# Patient Record
Sex: MALE | Race: WHITE | NOT HISPANIC OR LATINO
[De-identification: names, ages, dates, MRNs, and addresses within clinical notes are randomized per-mention and may not be internally consistent; named-entity substitution may affect disease eponyms.]

---

## 2018-02-05 ENCOUNTER — TRANSCRIPTION ENCOUNTER (OUTPATIENT)
Age: 33
End: 2018-02-05

## 2018-09-08 ENCOUNTER — TRANSCRIPTION ENCOUNTER (OUTPATIENT)
Age: 33
End: 2018-09-08

## 2022-10-31 ENCOUNTER — NON-APPOINTMENT (OUTPATIENT)
Age: 37
End: 2022-10-31

## 2022-11-07 ENCOUNTER — NON-APPOINTMENT (OUTPATIENT)
Age: 37
End: 2022-11-07

## 2022-12-11 ENCOUNTER — NON-APPOINTMENT (OUTPATIENT)
Age: 37
End: 2022-12-11

## 2023-03-22 ENCOUNTER — APPOINTMENT (OUTPATIENT)
Dept: GASTROENTEROLOGY | Facility: CLINIC | Age: 38
End: 2023-03-22
Payer: COMMERCIAL

## 2023-03-22 DIAGNOSIS — K21.9 GASTRO-ESOPHAGEAL REFLUX DISEASE W/OUT ESOPHAGITIS: ICD-10-CM

## 2023-03-22 DIAGNOSIS — R10.13 EPIGASTRIC PAIN: ICD-10-CM

## 2023-03-22 DIAGNOSIS — Z80.3 FAMILY HISTORY OF MALIGNANT NEOPLASM OF BREAST: ICD-10-CM

## 2023-03-22 DIAGNOSIS — Z78.9 OTHER SPECIFIED HEALTH STATUS: ICD-10-CM

## 2023-03-22 DIAGNOSIS — R63.4 ABNORMAL WEIGHT LOSS: ICD-10-CM

## 2023-03-22 DIAGNOSIS — Z72.0 TOBACCO USE: ICD-10-CM

## 2023-03-22 PROCEDURE — 99204 OFFICE O/P NEW MOD 45 MIN: CPT | Mod: 95

## 2023-03-22 RX ORDER — NIZATIDINE 300 MG/1
CAPSULE ORAL
Refills: 0 | Status: ACTIVE | COMMUNITY

## 2023-03-22 NOTE — ASSESSMENT
[FreeTextEntry1] : 37 year old male patient average risk for CRC, smoker for 8 years now, presents with 15-20 pounds weight loss over last year, associated with indigestion and dyspepsia\par Denies any nausea, vomiting or abdominal pain, but reports heartburn and bloating. \par PMD gave him an H2B and got partial relief. \par No night symptoms. Has soft stools daily. \par \par Dyspepsia, weight loss\par r/o H pylori gastritis vs gastroparesis\par will do a trial of PPI\par Needs EGD\par Risks and benefits discussed with patient.\par will consider colonoscopy and CT scan of abdomen after EGD if unrevealing

## 2023-03-22 NOTE — PHYSICAL EXAM
[Alert] : alert [Sclera] : the sclera and conjunctiva were normal [Hearing Threshold Finger Rub Not [place]] : hearing was normal [Normal Appearance] : the appearance of the neck was normal [Normal Color / Pigmentation] : normal skin color and pigmentation [Oriented To Time, Place, And Person] : oriented to person, place, and time

## 2023-03-22 NOTE — HISTORY OF PRESENT ILLNESS
[Home] : at home, [unfilled] , at the time of the visit. [Other Location: e.g. Home (Enter Location, City,State)___] : at [unfilled] [Verbal consent obtained from patient] : the patient, [unfilled] [FreeTextEntry4] : Alana Garcia [FreeTextEntry1] : 37 year old male patient average risk for CRC, smoker for 8 years now, presents with 15-20 pounds weight loss over last year, associated with indigestion and dyspepsia\par Denies any nausea, vomiting or abdominal pain, but reports heartburn and bloating. \par PMD gave him an H2B and got partial relief. \par No night symptoms. Has soft stools daily.

## 2023-04-07 ENCOUNTER — TRANSCRIPTION ENCOUNTER (OUTPATIENT)
Age: 38
End: 2023-04-07

## 2023-04-07 ENCOUNTER — OUTPATIENT (OUTPATIENT)
Dept: INPATIENT UNIT | Facility: HOSPITAL | Age: 38
LOS: 1 days | Discharge: HOME | End: 2023-04-07
Payer: COMMERCIAL

## 2023-04-07 ENCOUNTER — RESULT REVIEW (OUTPATIENT)
Age: 38
End: 2023-04-07

## 2023-04-07 VITALS
HEART RATE: 62 BPM | SYSTOLIC BLOOD PRESSURE: 124 MMHG | OXYGEN SATURATION: 100 % | RESPIRATION RATE: 18 BRPM | DIASTOLIC BLOOD PRESSURE: 77 MMHG

## 2023-04-07 VITALS
DIASTOLIC BLOOD PRESSURE: 76 MMHG | RESPIRATION RATE: 18 BRPM | HEART RATE: 60 BPM | SYSTOLIC BLOOD PRESSURE: 136 MMHG | TEMPERATURE: 99 F | WEIGHT: 111.99 LBS | HEIGHT: 67 IN

## 2023-04-07 DIAGNOSIS — F17.210 NICOTINE DEPENDENCE, CIGARETTES, UNCOMPLICATED: ICD-10-CM

## 2023-04-07 DIAGNOSIS — K31.84 GASTROPARESIS: ICD-10-CM

## 2023-04-07 DIAGNOSIS — R10.13 EPIGASTRIC PAIN: ICD-10-CM

## 2023-04-07 DIAGNOSIS — K21.9 GASTRO-ESOPHAGEAL REFLUX DISEASE WITHOUT ESOPHAGITIS: ICD-10-CM

## 2023-04-07 DIAGNOSIS — Z72.0 TOBACCO USE: ICD-10-CM

## 2023-04-07 DIAGNOSIS — K29.50 UNSPECIFIED CHRONIC GASTRITIS WITHOUT BLEEDING: ICD-10-CM

## 2023-04-07 PROCEDURE — 88313 SPECIAL STAINS GROUP 2: CPT | Mod: 26

## 2023-04-07 PROCEDURE — 88305 TISSUE EXAM BY PATHOLOGIST: CPT | Mod: 26

## 2023-04-07 PROCEDURE — 43239 EGD BIOPSY SINGLE/MULTIPLE: CPT

## 2023-04-07 PROCEDURE — 88312 SPECIAL STAINS GROUP 1: CPT | Mod: 26

## 2023-04-07 PROCEDURE — 88312 SPECIAL STAINS GROUP 1: CPT

## 2023-04-07 PROCEDURE — 88313 SPECIAL STAINS GROUP 2: CPT

## 2023-04-07 PROCEDURE — 88305 TISSUE EXAM BY PATHOLOGIST: CPT

## 2023-04-07 RX ORDER — PANTOPRAZOLE SODIUM 20 MG/1
1 TABLET, DELAYED RELEASE ORAL
Refills: 0 | DISCHARGE

## 2023-04-07 NOTE — ASU PATIENT PROFILE, ADULT - FALL HARM RISK - UNIVERSAL INTERVENTIONS
Bed in lowest position, wheels locked, appropriate side rails in place/Call bell, personal items and telephone in reach/Instruct patient to call for assistance before getting out of bed or chair/Non-slip footwear when patient is out of bed/[place] to call system/Physically safe environment - no spills, clutter or unnecessary equipment/Purposeful Proactive Rounding/Room/bathroom lighting operational, light cord in reach

## 2023-04-07 NOTE — CHART NOTE - NSCHARTNOTEFT_GEN_A_CORE
PACU ANESTHESIA ADMISSION NOTE      Procedure:   Post op diagnosis:      ____  Intubated  TV:______       Rate: ______      FiO2: ______    _x___  Patent Airway    _x___  Full return of protective reflexes    _x___  Full recovery from anesthesia / back to baseline status    Vitals:    VS     BP  123/56  P  87  R  16  Sat  98    Mental Status:  _x___ Awake   _____ Alert   _____ Drowsy   _____ Sedated    Nausea/Vomiting:  _x___  NO       ______Yes,   See Post - Op Orders         Pain Scale (0-10):  __0___    Treatment: _x___ None    ____ See Post - Op/PCA Orders    Post - Operative Fluids:   __x__ Oral   ____ See Post - Op Orders    Plan: Discharge:   _x___Home       _____Floor     _____Critical Care    _____  Other:_________________    Comments:  No anesthesia issues or complications noted.  Discharge when criteria met.

## 2023-04-13 LAB — SURGICAL PATHOLOGY STUDY: SIGNIFICANT CHANGE UP

## 2023-06-26 PROBLEM — Z87.898 PERSONAL HISTORY OF OTHER SPECIFIED CONDITIONS: Chronic | Status: ACTIVE | Noted: 2023-04-07

## 2023-07-14 ENCOUNTER — OUTPATIENT (OUTPATIENT)
Dept: OUTPATIENT SERVICES | Facility: HOSPITAL | Age: 38
LOS: 1 days | End: 2023-07-14
Payer: COMMERCIAL

## 2023-07-14 DIAGNOSIS — R10.13 EPIGASTRIC PAIN: ICD-10-CM

## 2023-07-14 PROCEDURE — 78264 GASTRIC EMPTYING IMG STUDY: CPT

## 2023-07-14 PROCEDURE — 78264 GASTRIC EMPTYING IMG STUDY: CPT | Mod: 26

## 2023-07-15 DIAGNOSIS — R10.13 EPIGASTRIC PAIN: ICD-10-CM

## 2023-08-09 ENCOUNTER — NON-APPOINTMENT (OUTPATIENT)
Age: 38
End: 2023-08-09

## 2023-08-15 ENCOUNTER — NON-APPOINTMENT (OUTPATIENT)
Age: 38
End: 2023-08-15

## 2023-09-01 ENCOUNTER — RESULT REVIEW (OUTPATIENT)
Age: 38
End: 2023-09-01

## 2023-09-01 ENCOUNTER — OUTPATIENT (OUTPATIENT)
Dept: OUTPATIENT SERVICES | Facility: HOSPITAL | Age: 38
LOS: 1 days | Discharge: HOME | End: 2023-09-01
Payer: COMMERCIAL

## 2023-09-01 ENCOUNTER — TRANSCRIPTION ENCOUNTER (OUTPATIENT)
Age: 38
End: 2023-09-01

## 2023-09-01 VITALS
WEIGHT: 113.1 LBS | SYSTOLIC BLOOD PRESSURE: 144 MMHG | HEART RATE: 57 BPM | RESPIRATION RATE: 18 BRPM | HEIGHT: 67 IN | TEMPERATURE: 98 F | DIASTOLIC BLOOD PRESSURE: 80 MMHG

## 2023-09-01 VITALS
SYSTOLIC BLOOD PRESSURE: 130 MMHG | OXYGEN SATURATION: 96 % | DIASTOLIC BLOOD PRESSURE: 76 MMHG | HEART RATE: 56 BPM | RESPIRATION RATE: 18 BRPM

## 2023-09-01 DIAGNOSIS — K31.84 GASTROPARESIS: ICD-10-CM

## 2023-09-01 DIAGNOSIS — R10.13 EPIGASTRIC PAIN: ICD-10-CM

## 2023-09-01 PROCEDURE — 43239 EGD BIOPSY SINGLE/MULTIPLE: CPT

## 2023-09-01 PROCEDURE — 88305 TISSUE EXAM BY PATHOLOGIST: CPT | Mod: 26

## 2023-09-01 PROCEDURE — 88312 SPECIAL STAINS GROUP 1: CPT

## 2023-09-01 PROCEDURE — 88312 SPECIAL STAINS GROUP 1: CPT | Mod: 26

## 2023-09-01 PROCEDURE — 88305 TISSUE EXAM BY PATHOLOGIST: CPT

## 2023-09-01 NOTE — CHART NOTE - NSCHARTNOTEFT_GEN_A_CORE
PACU ANESTHESIA ADMISSION NOTE      Procedure:   Post op diagnosis:      ____  Intubated  TV:______       Rate: ______      FiO2: ______    _x___  Patent Airway    _x___  Full return of protective reflexes    _x___  Full recovery from anesthesia / back to baseline status    Vitals:    BP  123/67  P  54  R  14  Sat  98    Mental Status:  _x___ Awake   _____ Alert   _____ Drowsy   _____ Sedated    Nausea/Vomiting:  _x___  NO       ______Yes,   See Post - Op Orders         Pain Scale (0-10):  __0___    Treatment: _x___ None    ____ See Post - Op/PCA Orders    Post - Operative Fluids:   __x__ Oral   ____ See Post - Op Orders    Plan: Discharge:   _x___Home       _____Floor     _____Critical Care    _____  Other:_________________    Comments:  No anesthesia issues or complications noted.  Discharge when criteria met.

## 2023-09-01 NOTE — ASU DISCHARGE PLAN (ADULT/PEDIATRIC) - CALL YOUR DOCTOR IF YOU HAVE ANY OF THE FOLLOWING:
Bleeding that does not stop/Fever greater than (need to indicate Fahrenheit or Celsius)/Wound/Surgical Site with redness, or foul smelling discharge or pus/Nausea and vomiting that does not stop/Excessive diarrhea/Inability to tolerate liquids or foods

## 2023-09-01 NOTE — H&P PST ADULT - ASSESSMENT
37 year old male patient average risk for CRC, smoker for 8 years now, presents with 15-20 pounds weight loss over last year, associated with indigestion and dyspepsia here for egd

## 2023-09-01 NOTE — ASU DISCHARGE PLAN (ADULT/PEDIATRIC) - C. MAKE IMPORTANT PERSONAL OR BUSINESS DECISIONS
Statement Selected

## 2023-09-01 NOTE — ASU DISCHARGE PLAN (ADULT/PEDIATRIC) - CARE PROVIDER_API CALL
Lyric Pickard  Gastroenterology  [address] [place] [address]  Phone: [phone]  Fax: [phone]  Established Patient  Follow Up Time:

## 2023-09-06 LAB — SURGICAL PATHOLOGY STUDY: SIGNIFICANT CHANGE UP

## 2023-09-08 DIAGNOSIS — R10.13 EPIGASTRIC PAIN: ICD-10-CM

## 2023-09-08 DIAGNOSIS — R63.4 ABNORMAL WEIGHT LOSS: ICD-10-CM

## 2023-09-08 DIAGNOSIS — F17.200 NICOTINE DEPENDENCE, UNSPECIFIED, UNCOMPLICATED: ICD-10-CM

## 2023-09-27 ENCOUNTER — NON-APPOINTMENT (OUTPATIENT)
Age: 38
End: 2023-09-27

## 2023-12-18 ENCOUNTER — APPOINTMENT (OUTPATIENT)
Dept: ORTHOPEDIC SURGERY | Facility: CLINIC | Age: 38
End: 2023-12-18
Payer: COMMERCIAL

## 2023-12-18 PROCEDURE — 72110 X-RAY EXAM L-2 SPINE 4/>VWS: CPT

## 2023-12-18 PROCEDURE — 99204 OFFICE O/P NEW MOD 45 MIN: CPT

## 2023-12-18 NOTE — DATA REVIEWED
[FreeTextEntry1] : I obtained and reviewed AP and lateral extension lumbar x-rays.  The patient has disc base collapse at L5-S1.  No instability.

## 2023-12-18 NOTE — IMAGING
[de-identified] : TTP midline spine and paraspinal musculature  Strength                                          Hip flexor   Right: 5/5; Left: 5/5                              Knee extensor     Right: 5/5; Left: 5/5                      Ankle dorsiflexion   Right: 5/5; Left: 5/5                   EHL           Right: 5/5; Left: 5/5                                 Ankle plantarflexion       Right: 5/5; Left: 5/5  Sensation L1   Right: 2/2; Left: 2/2 L2   Right: 2/2; Left: 2/2 L3   Right: 2/2; Left: 2/2 L4   Right: 2/2; Left: 2/2 L5   Right: 2/2; Left: 2/2 S1   Right: 2/2; Left: 2/2  Reflexes Patella   Right: 2+; Left 2+ Achilles   Right: 2+; Left 2+ Clonus  Right: absent; L: absent

## 2023-12-18 NOTE — DISCUSSION/SUMMARY
[de-identified] : 38-year-old male presents with lumbar disc degeneration and low back pain.  I am giving him prescription for chiropractor.  I discussed with him interventional pain management as well.  He can try the chiropractor treatment and if that does not help him then he can make an appointment with Dr. Flores from pain management to discuss injections.  I told him to follow back up with me if nothing else helps him.  We had a long discussion about lumbar degeneration.

## 2023-12-18 NOTE — HISTORY OF PRESENT ILLNESS
[de-identified] : 38 male presents to me with many years of low back pain.  At times when the low back pain is at its worst it radiates down his legs.  However primarily the pain is worst in the back more so than his notes he is taking anti-inflammatories for this in the past which only hurt his stomach and that really helped his pain.  He has been to physical therapy in the past and there he did an exercise that made things worse so he stopped going.  He has never been to chiropractor.  He has never been to pain management.  No loss of bladder or bowel.

## 2024-01-10 ENCOUNTER — APPOINTMENT (OUTPATIENT)
Dept: PAIN MANAGEMENT | Facility: CLINIC | Age: 39
End: 2024-01-10
Payer: COMMERCIAL

## 2024-01-10 VITALS
BODY MASS INDEX: 18.52 KG/M2 | DIASTOLIC BLOOD PRESSURE: 91 MMHG | SYSTOLIC BLOOD PRESSURE: 139 MMHG | HEIGHT: 67 IN | HEART RATE: 61 BPM | WEIGHT: 118 LBS

## 2024-01-10 DIAGNOSIS — Z00.00 ENCOUNTER FOR GENERAL ADULT MEDICAL EXAMINATION W/OUT ABNORMAL FINDINGS: ICD-10-CM

## 2024-01-10 DIAGNOSIS — R03.0 ELEVATED BLOOD-PRESSURE READING, W/OUT DIAGNOSIS OF HYPERTENSION: ICD-10-CM

## 2024-01-10 PROCEDURE — 99204 OFFICE O/P NEW MOD 45 MIN: CPT

## 2024-01-10 RX ORDER — IBUPROFEN 800 MG/1
800 TABLET, FILM COATED ORAL
Refills: 0 | Status: ACTIVE | COMMUNITY

## 2024-01-10 RX ORDER — METHOCARBAMOL 750 MG/1
750 TABLET, FILM COATED ORAL
Refills: 0 | Status: ACTIVE | COMMUNITY

## 2024-01-10 RX ORDER — CYCLOBENZAPRINE HYDROCHLORIDE 7.5 MG/1
TABLET, FILM COATED ORAL
Refills: 0 | Status: ACTIVE | COMMUNITY

## 2024-01-10 RX ORDER — NAPROXEN 500 MG/1
500 TABLET ORAL
Refills: 0 | Status: ACTIVE | COMMUNITY

## 2024-01-10 RX ORDER — MELOXICAM 7.5 MG/1
7.5 TABLET ORAL
Refills: 0 | Status: ACTIVE | COMMUNITY

## 2024-01-10 NOTE — ASSESSMENT
[FreeTextEntry1] : This is a 38-year-old male with complaints of lower back pain which is occasionally associated with radicular features into the bilateral lower extremities. When he experiences the radicular pain, it is usually accompanied by numbness/tingling. Prior physical therapy exercises increased his pain, so he stopped attending sessions. He then started chiropractic manipulation which aides in his ROM. The pain continues to be bothersome and limits his functionality. We will obtain an MRI of the lumbar spine for further review and pending the results, we will discuss trialing injection therapy. In the interim, he is advised to continue with chiropractic manipulation for symptoms control. Patient will follow up in 3 weeks to review imaging and reassessment. All this patients questions were answered and the conversation was understood well.   Lumbar MRI without contrast was ordered to delineate spine pathology such as disc displacement and stenosis.  I, Felicitas Dean, attest that this documentation has been prepared under the direction and in the presence of Provider Mary Flores MD.   Thank you for allowing me to assist in the management of this patient.    Best Regards,    Mary Flores M.D., FAAPMR   Diplomate, American Board of Physical Medicine and Rehabilitation Diplomate, American Board of Pain Medicine  Diplomate, American Board of Pain Management

## 2024-01-10 NOTE — PHYSICAL EXAM
[Flexion] : flexion [Extension] : extension [Normal Coordination] : normal coordination [Normal DTR UE/LE] : normal DTR UE/LE  [Normal Sensation] : normal sensation [Normal Mood and Affect] : normal mood and affect [Orientated] : orientated [Able to Communicate] : able to communicate [Well Developed] : well developed [Well Nourished] : well nourished [] : no swelling [FreeTextEntry9] : Flexion>extension [TWNoteComboBox7] : forward flexion 45 degrees [de-identified] : extension 10 degrees

## 2024-01-10 NOTE — HISTORY OF PRESENT ILLNESS
[Lower back] : lower back [Left Leg] : left leg [Right Leg] : right leg [Gradual] : gradual [5] : 5 [0] : 0 [Dull/Aching] : dull/aching [Sharp] : sharp [Constant] : constant [Household chores] : household chores [Work] : work [Social interactions] : social interactions [Rest] : rest [Ice] : ice [Heat] : heat [Sitting] : sitting [Standing] : standing [Bending forward] : bending forward [Extending back] : extending back [Exercising] : exercising [Stairs] : stairs [Physical therapy] : physical therapy [Full time] : Work status: full time [] : This patient has had an injection before: no [FreeTextEntry1] : HIPS  [FreeTextEntry4] : 2023 [FreeTextEntry6] : ACHING WHEN SITTING, SHARP WHEN ACTIVE [FreeTextEntry7] : LOWER BACK TO HIPS AND LEGS [FreeTextEntry9] : CHIROPRACTOR [de-identified] : SNEEZING, GETTING OUT OF BED  [de-identified] : 12/18/23 [de-identified] : DR. CAROLINA [de-identified] : 10/2023 [de-identified] : MOOSE @[id number] [de-identified] : NURSE

## 2024-01-10 NOTE — DATA REVIEWED
[FreeTextEntry1] : X-ray of the lumbar spine dated 12/18/23 demonstrates disc base collapse at L5-S1. No instability.  UDS: No data obtained today.  [place] REGISTRY: Checked.

## 2024-01-26 ENCOUNTER — APPOINTMENT (OUTPATIENT)
Dept: MRI IMAGING | Facility: CLINIC | Age: 39
End: 2024-01-26
Payer: COMMERCIAL

## 2024-01-26 PROCEDURE — 72148 MRI LUMBAR SPINE W/O DYE: CPT

## 2024-02-07 ENCOUNTER — APPOINTMENT (OUTPATIENT)
Dept: PAIN MANAGEMENT | Facility: CLINIC | Age: 39
End: 2024-02-07

## 2024-02-09 ENCOUNTER — APPOINTMENT (OUTPATIENT)
Dept: PAIN MANAGEMENT | Facility: CLINIC | Age: 39
End: 2024-02-09
Payer: COMMERCIAL

## 2024-02-09 PROCEDURE — 99214 OFFICE O/P EST MOD 30 MIN: CPT

## 2024-02-09 NOTE — HISTORY OF PRESENT ILLNESS
[Lower back] : lower back [Left Leg] : left leg [Right Leg] : right leg [Gradual] : gradual [5] : 5 [0] : 0 [Dull/Aching] : dull/aching [Sharp] : sharp [Constant] : constant [Household chores] : household chores [Work] : work [Social interactions] : social interactions [Rest] : rest [Ice] : ice [Heat] : heat [Sitting] : sitting [Standing] : standing [Bending forward] : bending forward [Extending back] : extending back [Exercising] : exercising [Stairs] : stairs [Physical therapy] : physical therapy [Full time] : Work status: full time [] : This patient has had an injection before: no [FreeTextEntry1] : HIPS  [FreeTextEntry4] : 2023 [FreeTextEntry6] : ACHING WHEN SITTING, SHARP WHEN ACTIVE [FreeTextEntry7] : LOWER BACK TO HIPS AND LEGS [FreeTextEntry9] : CHIROPRACTOR [de-identified] : SNEEZING, GETTING OUT OF BED  [de-identified] : 12/18/23 [de-identified] : DR. CAROLINA [de-identified] : 10/2023 [de-identified] : MOOSE @[id number] [de-identified] : NURSE

## 2024-02-09 NOTE — ASSESSMENT
[FreeTextEntry1] : This is a 38-year-old male with complaints of lower back pain which is occasionally associated with radicular features into the bilateral lower extremities. When he experiences the radicular pain, it is usually accompanied by numbness/tingling. Prior physical therapy exercises increased his pain, so he stopped attending sessions. He then started chiropractic manipulation which aides in his ROM. The pain continues to be bothersome and limits his functionality.  Imaging studies as well as physical exam findings corroborate the symptomatology. We will proceed with a B/L L5-S1 TFESI x1 and he will follow up afterwards for reassessment. All this patients questions were answered and the conversation was understood well.  Patient had a MRI that shows a radicular component along with pain referred into the lower extremities. Patient has trialed rehab (home exercise, physical therapy or chiropractic care) and medications. I will schedule a bilateral L5-S1 TFESI.  RISK AND BENEFIT PARAGRAPH: Risk, benefits, pros and cons of procedure were explained to the patient using models and diagrams and their questions were answered.  I, Felicitas Dean, attest that this documentation has been prepared under the direction and in the presence of Provider Mary Flores MD.   Thank you for allowing me to assist in the management of this patient.    Best Regards,    Mary Flores M.D., FAAPMR   Diplomate, American Board of Physical Medicine and Rehabilitation Diplomate, American Board of Pain Medicine  Diplomate, American Board of Pain Management

## 2024-02-09 NOTE — PHYSICAL EXAM
[Normal Coordination] : normal coordination [Normal DTR UE/LE] : normal DTR UE/LE  [Normal Sensation] : normal sensation [Normal Mood and Affect] : normal mood and affect [Orientated] : orientated [Able to Communicate] : able to communicate [Well Developed] : well developed [Well Nourished] : well nourished [Flexion] : flexion [Extension] : extension [] : no swelling [FreeTextEntry9] : Flexion>extension [TWNoteComboBox7] : forward flexion 45 degrees [de-identified] : extension 10 degrees

## 2024-02-09 NOTE — DATA REVIEWED
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  Central right central L5-S1 disc herniation and ridge formation insinuated between the S1 roots more right than left.  Otherwise normal lumbar spine MRI.  X-ray of the lumbar spine dated 12/18/23 demonstrates disc base collapse at L5-S1. No instability.  UDS: No data obtained today.  [place] REGISTRY: Checked.

## 2024-03-01 ENCOUNTER — APPOINTMENT (OUTPATIENT)
Dept: PAIN MANAGEMENT | Facility: CLINIC | Age: 39
End: 2024-03-01
Payer: COMMERCIAL

## 2024-03-01 PROCEDURE — 93040 RHYTHM ECG WITH REPORT: CPT | Mod: 79

## 2024-03-01 PROCEDURE — 93770 DETERMINATION VENOUS PRESS: CPT

## 2024-03-01 PROCEDURE — 64483 NJX AA&/STRD TFRM EPI L/S 1: CPT | Mod: 50

## 2024-03-01 PROCEDURE — 94761 N-INVAS EAR/PLS OXIMETRY MLT: CPT

## 2024-03-01 NOTE — PROCEDURE
[FreeTextEntry3] : SELECTIVE TRANSFORAMINAL LUMBAR EPIDURAL NERVE ROOT INJECTION UNDER FLUOROSCOPY     Date:  2024  Patient: Miriam Lindo  :  1985   Preoperative Diagnosis: L5 Radiculitis  Procedure: Selective Bilateral L5-S1 Transforaminal Lumbar Epidural Nerve Root Injection under Fluoroscopy  Physician: Mary Flores MD, FAAPMR  Anesthesia: See nurses notes/Local/Cold spray     Medical Necessity:  Failure of conservative management.     Indication for Fluoroscopy:  This procedure requires the precise placement of the spinal needle into the specific paravertebral foramen.  It is the only way to accurately and safely perform the injection.     CONSENT: The possible complications including infection, bleeding, nerve damage, Hospital admission, death or failure of the procedure; though unusual, are theoretically possible. The patient was educated about the of the procedure and alternative therapies. All questions were answered and the patient freely gave consent to proceed.     Monitoring:  Patient had continuous blood pressure, EKG, and pulse oximetry throughout the case. See nurse's notes.     PROCEDURE NOTE:  After obtaining written consent, the patient was then positioned on the fluoroscopy table in the prone position with a pillow beneath the pelvis to reduce lumbar lordosis. The lumbar area was prepped with betadine solution and draped in the usual manner. The fluoroscope was used to identify the L3///L4///L5 vertebral body on the AP projection. It was then rotated into an oblique projection until the superior articulating process of the S1 (inferior) vertebra is projected beneath the 6 o'clock position of the L5 (superior) vertebrae. The 22 gauge 3.5inch needle was inserted in the skin at a point overlying the superior articulating process of the inferior vertebra and aimed for the 6 o'clock position of the superior vertebrae's pedicle.  After the needle contacted bone, a lateral projection was obtained to insure that the needle tip was in proximity with the vertebral body. Paresthesias were not noted.  One ml of Omnipaque 240 was injected and a neurogram was obtained. Following demonstration of the neurogram, 1 ml of Preservative free normal saline and 1 ml of depomedrol (40mg) was injected. The small volume and relatively high concentration was chosen to preserve selectivity and diagnostic value of the injection. There was no CSF nor heme identified. The contralateral side was injected in identical fashion. There were no signs of, intravascular block or hypotension. The needle was removed intact. A band aid was place on the site.     Epidurogram: The nerve root was observed in its outline on the neurogram. Distal and proximal spread was noted pointing away from epidural fibrosis/scarring.   Complications: None. The patient tolerated the procedure well.    Disposition: I have examined the patient and there are no new physical findings since the original presentation.  Sensory and motor function were intact. The patient met discharge criteria see nurses notes. The discharge instruction sheet was reviewed and given to the patient. The patient was discharged home with a .  If patient gets sustained relief will have patient do modified planks 3 times a day on carpet or yoga mat starting at 5 seconds building up to 1 minute without grimacing/Valsalva and walking.      Comment: 1st SNRI today, depending on effectiveness would schedule 2nd SNRI in 1-2 weeks vs caudal epidural steroid vs follow up in office depending on insurances. Follow up office. Call if any problems        This document was electronically signed by:    Mary Flores MD, FAAPMR Diplomate, American Board of Physical Medicine and Rehabilitation Diplomate, American Board of Pain Medicine

## 2024-03-14 ENCOUNTER — APPOINTMENT (OUTPATIENT)
Dept: PAIN MANAGEMENT | Facility: CLINIC | Age: 39
End: 2024-03-14
Payer: COMMERCIAL

## 2024-03-14 PROCEDURE — 99214 OFFICE O/P EST MOD 30 MIN: CPT

## 2024-03-14 NOTE — REASON FOR VISIT
[Initial Visit] : an initial pain management visit [FreeTextEntry2] : LOWER BACK PAIN- S/P Bilateral L5-S1 Transforaminal Lumbar Epidural Nerve Root Injection

## 2024-03-14 NOTE — HISTORY OF PRESENT ILLNESS
[Left Leg] : left leg [Lower back] : lower back [Right Leg] : right leg [Gradual] : gradual [5] : 5 [0] : 0 [Dull/Aching] : dull/aching [Sharp] : sharp [Household chores] : household chores [Constant] : constant [Work] : work [Social interactions] : social interactions [Ice] : ice [Rest] : rest [Heat] : heat [Sitting] : sitting [Standing] : standing [Bending forward] : bending forward [Extending back] : extending back [Exercising] : exercising [Stairs] : stairs [Physical therapy] : physical therapy [Full time] : Work status: full time [] : This patient has had an injection before: no [FreeTextEntry1] : HIPS  [FreeTextEntry4] : 2023 [FreeTextEntry6] : ACHING WHEN SITTING, SHARP WHEN ACTIVE [FreeTextEntry7] : LOWER BACK TO HIPS AND LEGS [de-identified] : 12/18/23 [de-identified] : SNEEZING, GETTING OUT OF BED  [FreeTextEntry9] : CHIROPRACTOR [de-identified] : DR. CAROLINA [de-identified] : 10/2023 [de-identified] : MOOSE @[id number] [de-identified] : NURSE

## 2024-03-14 NOTE — PHYSICAL EXAM
[Normal Coordination] : normal coordination [Normal DTR UE/LE] : normal DTR UE/LE  [Normal Sensation] : normal sensation [Normal Mood and Affect] : normal mood and affect [Oriented] : oriented [Able to Communicate] : able to communicate [Well Nourished] : well nourished [Well Developed] : well developed [Extension] : extension [Flexion] : flexion [] : no swelling [FreeTextEntry9] : Flexion>extension [TWNoteComboBox7] : forward flexion 45 degrees [de-identified] : extension 10 degrees

## 2024-03-14 NOTE — ASSESSMENT
[FreeTextEntry1] : This is a 38-year-old male with complaints of lower back pain which is occasionally associated with radicular features into the bilateral lower extremities. He is S/P a bilateral L5-S1 TFESI on 3/1/24, which provided him with >50% relief. He wishes to undergo a repeat bilateral L5-S1 TFESI for added relief. He will also undergo PT for his lumbar pain.  RTO post-injection.  Physical therapy of the lumbar spine 2-3 times a week for 4-8 weeks stressing a home exercise program of walking, shoulder girdle strengthening, swimming, elliptical , recumbent bike, Ander chi and Yoga. Use things that heat like hot shower or icy heat before rehab,exercising and at the beginning of the day, and ice (ice in a bag never directly on the skin) after activity and at the end of the day.  Patient had a MRI that shows a radicular component along with pain referred into the lower extremities. Patient has trialed rehab (home exercise, physical therapy or chiropractic care) and medications. I will schedule a bilateral L5-S1 TFESI.  Risk, benefits, pros and cons of procedure were explained to the patient using models and diagrams and their questions were answered.  MARCELINO Rangel MD

## 2024-03-22 ENCOUNTER — NON-APPOINTMENT (OUTPATIENT)
Age: 39
End: 2024-03-22

## 2024-03-29 ENCOUNTER — APPOINTMENT (OUTPATIENT)
Dept: PAIN MANAGEMENT | Facility: CLINIC | Age: 39
End: 2024-03-29
Payer: COMMERCIAL

## 2024-03-29 PROCEDURE — 64483 NJX AA&/STRD TFRM EPI L/S 1: CPT | Mod: 50

## 2024-03-29 PROCEDURE — 93770 DETERMINATION VENOUS PRESS: CPT

## 2024-03-29 PROCEDURE — 94761 N-INVAS EAR/PLS OXIMETRY MLT: CPT

## 2024-03-29 PROCEDURE — 93040 RHYTHM ECG WITH REPORT: CPT | Mod: 79

## 2024-03-29 NOTE — PROCEDURE
[FreeTextEntry3] : SELECTIVE TRANSFORAMINAL LUMBAR EPIDURAL NERVE ROOT INJECTION UNDER FLUOROSCOPY     Date:  2024  Patient: Miriam Lindo  :  1985   Preoperative Diagnosis: L5 Radiculitis  Procedure: Selective Bilateral L5-S1 Transforaminal Lumbar Epidural Nerve Root Injection under Fluoroscopy  Physician: Mary Flores MD, FAAPMR  Anesthesia: See nurses notes/Local/Cold spray   Medical Necessity:  Failure of conservative management.     Indication for Fluoroscopy:  This procedure requires the precise placement of the spinal needle into the specific paravertebral foramen.  It is the only way to accurately and safely perform the injection.     CONSENT: The possible complications including infection, bleeding, nerve damage, Hospital admission, death or failure of the procedure; though unusual, are theoretically possible. The patient was educated about the of the procedure and alternative therapies. All questions were answered and the patient freely gave consent to proceed.     Monitoring:  Patient had continuous blood pressure, EKG, and pulse oximetry throughout the case. See nurse's notes.     PROCEDURE NOTE:  After obtaining written consent, the patient was then positioned on the fluoroscopy table in the prone position with a pillow beneath the pelvis to reduce lumbar lordosis. The lumbar area was prepped with betadine solution and draped in the usual manner. The fluoroscope was used to identify the L3///L4///L5 vertebral body on the AP projection. It was then rotated into an oblique projection until the superior articulating process of the S1 (inferior) vertebra is projected beneath the 6 o'clock position of the L5 (superior) vertebrae. The 22 gauge 3.5inch needle was inserted in the skin at a point overlying the superior articulating process of the inferior vertebra and aimed for the 6 o'clock position of the superior vertebrae's pedicle.  After the needle contacted bone, a lateral projection was obtained to insure that the needle tip was in proximity with the vertebral body. Paresthesias were not noted.  One ml of Omnipaque 240 was injected and a neurogram was obtained. Following demonstration of the neurogram, 1 ml of Preservative free normal saline and 1 ml of depomedrol (40mg) was injected. The small volume and relatively high concentration was chosen to preserve selectivity and diagnostic value of the injection. There was no CSF nor heme identified. The contralateral side was injected in identical fashion. There were no signs of, intravascular block or hypotension. The needle was removed intact. A band aid was place on the site.     Epidurogram: The nerve root was observed in its outline on the neurogram. Distal and proximal spread was noted pointing away from epidural fibrosis/scarring.   Complications: None. The patient tolerated the procedure well.    Disposition: I have examined the patient and there are no new physical findings since the original presentation.  Sensory and motor function were intact. The patient met discharge criteria see nurses notes. The discharge instruction sheet was reviewed and given to the patient. The patient was discharged home with a .  If patient gets sustained relief will have patient do modified planks 3 times a day on carpet or yoga mat starting at 5 seconds building up to 1 minute without grimacing/Valsalva and walking.      Comment: 2nd SNRI today, depending on effectiveness would schedule 3rd SNRI in 1-2 weeks vs caudal epidural steroid vs follow up in office depending on insurances. Follow up office. Call if any problems    This document was electronically signed by:    Mary Flores MD, FAAPMR Diplomate, American Board of Physical Medicine and Rehabilitation Diplomate, American Board of Pain Medicine

## 2024-04-11 ENCOUNTER — APPOINTMENT (OUTPATIENT)
Dept: PAIN MANAGEMENT | Facility: CLINIC | Age: 39
End: 2024-04-11
Payer: COMMERCIAL

## 2024-04-11 PROCEDURE — 99213 OFFICE O/P EST LOW 20 MIN: CPT

## 2024-04-11 NOTE — ASSESSMENT
[FreeTextEntry1] : This is a 39-year-old male with complaints of lower back pain which is occasionally associated with radicular features into the bilateral lower extremities. He is S/P two bilateral L5-S1 TFESI's on 3/1/24 and a repeat one on 3/29/24, which also gave him >50% relief. He will start PT. Since he is feeling better, we will hold off on further injections for now.  RTO in 6 weeks for re-evaluation.   MARCELINO Rangel MD 
36.6

## 2024-04-11 NOTE — PHYSICAL EXAM
[Normal Coordination] : normal coordination [Normal DTR UE/LE] : normal DTR UE/LE  [Normal Sensation] : normal sensation [Normal Mood and Affect] : normal mood and affect [Oriented] : oriented [Able to Communicate] : able to communicate [Well Developed] : well developed [Well Nourished] : well nourished [Flexion] : flexion [Extension] : extension [] : no swelling [FreeTextEntry9] : Flexion>extension [TWNoteComboBox7] : forward flexion 45 degrees [de-identified] : extension 10 degrees

## 2024-04-11 NOTE — HISTORY OF PRESENT ILLNESS
[Lower back] : lower back [Left Leg] : left leg [Right Leg] : right leg [Gradual] : gradual [5] : 5 [0] : 0 [Dull/Aching] : dull/aching [Sharp] : sharp [Constant] : constant [Household chores] : household chores [Work] : work [Social interactions] : social interactions [Rest] : rest [Ice] : ice [Heat] : heat [Sitting] : sitting [Standing] : standing [Bending forward] : bending forward [Extending back] : extending back [Exercising] : exercising [Stairs] : stairs [Physical therapy] : physical therapy [Full time] : Work status: full time [] : This patient has had an injection before: no [FreeTextEntry1] : HIPS  [FreeTextEntry4] : 2023 [FreeTextEntry6] : ACHING WHEN SITTING, SHARP WHEN ACTIVE [FreeTextEntry7] : LOWER BACK TO HIPS AND LEGS [FreeTextEntry9] : CHIROPRACTOR [de-identified] : SNEEZING, GETTING OUT OF BED  [de-identified] : 12/18/23 [de-identified] : DR. CAROLINA [de-identified] : 10/2023 [de-identified] : MOOSE @[id number] [de-identified] : NURSE

## 2024-04-11 NOTE — REASON FOR VISIT
[Follow-Up Visit] : a follow-up pain management visit [FreeTextEntry2] : LOWER BACK PAIN - S/P Bilateral L5-S1 Transforaminal Lumbar Epidural Nerve Root Injection

## 2024-05-23 ENCOUNTER — APPOINTMENT (OUTPATIENT)
Dept: PAIN MANAGEMENT | Facility: CLINIC | Age: 39
End: 2024-05-23
Payer: COMMERCIAL

## 2024-05-23 DIAGNOSIS — M54.17 RADICULOPATHY, LUMBOSACRAL REGION: ICD-10-CM

## 2024-05-23 PROCEDURE — 20552 NJX 1/MLT TRIGGER POINT 1/2: CPT

## 2024-05-23 PROCEDURE — 99214 OFFICE O/P EST MOD 30 MIN: CPT | Mod: 25

## 2024-05-29 RX ORDER — PANTOPRAZOLE 40 MG/1
40 TABLET, DELAYED RELEASE ORAL DAILY
Qty: 1 | Refills: 6 | Status: ACTIVE | COMMUNITY
Start: 2023-03-22 | End: 1900-01-01

## 2024-06-20 ENCOUNTER — NON-APPOINTMENT (OUTPATIENT)
Age: 39
End: 2024-06-20

## 2024-06-20 ENCOUNTER — APPOINTMENT (OUTPATIENT)
Dept: PAIN MANAGEMENT | Facility: CLINIC | Age: 39
End: 2024-06-20
Payer: COMMERCIAL

## 2024-06-20 DIAGNOSIS — M51.36 OTHER INTERVERTEBRAL DISC DEGENERATION, LUMBAR REGION: ICD-10-CM

## 2024-06-20 DIAGNOSIS — M54.50 LOW BACK PAIN, UNSPECIFIED: ICD-10-CM

## 2024-06-20 DIAGNOSIS — M62.830 MUSCLE SPASM OF BACK: ICD-10-CM

## 2024-06-20 PROCEDURE — 99214 OFFICE O/P EST MOD 30 MIN: CPT

## 2024-06-20 RX ORDER — CELECOXIB 100 MG/1
100 CAPSULE ORAL
Qty: 60 | Refills: 0 | Status: ACTIVE | COMMUNITY
Start: 2024-06-20 | End: 1900-01-01

## 2024-06-20 NOTE — PROCEDURE
[Trigger point 1-2 muscle groups] : trigger point 1-2 muscle groups [Bilateral] : bilaterally of the [Lumbar paraspinal muscle] : lumbar paraspinal muscle [Pain] : pain [Alcohol] : alcohol [___ cc    2%] : Lidocaine ~Vcc of 2%  [___ cc    0.5%] : Bupivacaine (Marcaine) ~Vcc of 0.5%  [Call if redness, pain or fever occur] : call if redness, pain or fever occur [Apply ice for 15min out of every hour for the next 12-24 hours as tolerated] : apply ice for 15 minutes out of every hour for the next 12-24 hours as tolerated [Risks, benefits, alternatives discussed / Verbal consent obtained] : the risks benefits, and alternatives have been discussed, and verbal consent was obtained [Sterile technique used] : sterile technique used

## 2024-06-20 NOTE — HISTORY OF PRESENT ILLNESS
[Lower back] : lower back [Left Leg] : left leg [Right Leg] : right leg [Gradual] : gradual [5] : 5 [0] : 0 [Dull/Aching] : dull/aching [Sharp] : sharp [Constant] : constant [Household chores] : household chores [Work] : work [Social interactions] : social interactions [Rest] : rest [Ice] : ice [Heat] : heat [Sitting] : sitting [Standing] : standing [Bending forward] : bending forward [Extending back] : extending back [Exercising] : exercising [Stairs] : stairs [Physical therapy] : physical therapy [Full time] : Work status: full time [] : This patient has had an injection before: no [FreeTextEntry1] : HIPS  [FreeTextEntry4] : 2023 [FreeTextEntry6] : ACHING WHEN SITTING, SHARP WHEN ACTIVE [FreeTextEntry7] : LOWER BACK TO HIPS AND LEGS [FreeTextEntry9] : CHIROPRACTOR [de-identified] : SNEEZING, GETTING OUT OF BED  [de-identified] : 12/18/23 [de-identified] : DR. ACROLINA [de-identified] : MOOSE @[id number] [de-identified] : 10/2023 [de-identified] : NURSE

## 2024-06-20 NOTE — PHYSICAL EXAM
[Normal Coordination] : normal coordination [Normal DTR UE/LE] : normal DTR UE/LE  [Normal Sensation] : normal sensation [Normal Mood and Affect] : normal mood and affect [Oriented] : oriented [Able to Communicate] : able to communicate [Well Developed] : well developed [Well Nourished] : well nourished [Flexion] : flexion [Extension] : extension [] : no swelling [FreeTextEntry8] : Palpable trigger points. [FreeTextEntry9] : Flexion>extension [TWNoteComboBox7] : forward flexion 45 degrees [de-identified] : extension 10 degrees

## 2024-06-20 NOTE — ASSESSMENT
[FreeTextEntry1] : This is a 39-year-old male with complaints of lower back pain, He denies radicular features. He states that bending forward or backward makes his pain worse. He wishes to proceed with Lumbar paraspinal TPI today. Pt will RTO in 4 weeks for re-evaluation.   MARCELINO Rangel MD

## 2024-06-20 NOTE — PHYSICAL EXAM
[Normal Coordination] : normal coordination [Normal DTR UE/LE] : normal DTR UE/LE  [Normal Sensation] : normal sensation [Normal Mood and Affect] : normal mood and affect [Oriented] : oriented [Able to Communicate] : able to communicate [Well Developed] : well developed [Well Nourished] : well nourished [Flexion] : flexion [Extension] : extension [] : no swelling [FreeTextEntry9] : Flexion>extension [TWNoteComboBox7] : forward flexion 45 degrees [de-identified] : extension 10 degrees

## 2024-06-20 NOTE — HISTORY OF PRESENT ILLNESS
[Lower back] : lower back [Left Leg] : left leg [Right Leg] : right leg [Gradual] : gradual [5] : 5 [0] : 0 [Dull/Aching] : dull/aching [Sharp] : sharp [Constant] : constant [Household chores] : household chores [Work] : work [Social interactions] : social interactions [Rest] : rest [Ice] : ice [Heat] : heat [Sitting] : sitting [Standing] : standing [Bending forward] : bending forward [Extending back] : extending back [Exercising] : exercising [Stairs] : stairs [Physical therapy] : physical therapy [Full time] : Work status: full time [] : This patient has had an injection before: no [FreeTextEntry1] : HIPS  [FreeTextEntry4] : 2023 [FreeTextEntry6] : ACHING WHEN SITTING, SHARP WHEN ACTIVE [FreeTextEntry7] : LOWER BACK TO HIPS AND LEGS [FreeTextEntry9] : CHIROPRACTOR [de-identified] : SNEEZING, GETTING OUT OF BED  [de-identified] : 12/18/23 [de-identified] : DR. CAROLINA [de-identified] : 10/2023 [de-identified] : MOOSE @[id number] [de-identified] : NURSE

## 2024-08-01 ENCOUNTER — APPOINTMENT (OUTPATIENT)
Dept: PAIN MANAGEMENT | Facility: CLINIC | Age: 39
End: 2024-08-01
Payer: COMMERCIAL

## 2024-08-01 DIAGNOSIS — M54.50 LOW BACK PAIN, UNSPECIFIED: ICD-10-CM

## 2024-08-01 DIAGNOSIS — M62.830 MUSCLE SPASM OF BACK: ICD-10-CM

## 2024-08-01 DIAGNOSIS — M79.18 MYALGIA, OTHER SITE: ICD-10-CM

## 2024-08-01 DIAGNOSIS — M54.17 RADICULOPATHY, LUMBOSACRAL REGION: ICD-10-CM

## 2024-08-01 PROCEDURE — 20552 NJX 1/MLT TRIGGER POINT 1/2: CPT

## 2024-08-01 PROCEDURE — 99215 OFFICE O/P EST HI 40 MIN: CPT | Mod: 25

## 2024-08-01 NOTE — ASSESSMENT
[FreeTextEntry1] : This is a 39-year-old male with complaints of lower back pain and stiffness with radicular features down his lower extremities. He will proceed with a repeat L5-S1 TFESI without MAC. Patient also undergoes periodic lumbar TPI's, which he states gives him excellent relief. A lumbar TPI was done for him today. Celebrex 100 mg BID was re-sent to his pharmacy. He will continue with lidocaine 5% patches. RTO post-injection.  Patient has documented myofascial spasms and trigger points in the muscle. Patient has trialed rehab (Home exercise, physical therapy or chiropractic care) and medications. We will schedule a series of 1-3 lumbar trigger points over 3-6 weeks. If 50% or greater relief for 6-8 weeks another can be repeated vs Botox.  Patient had a MRI that shows a radicular component along with pain referred into the lower extremity. Patient has trialed rehab (home exercise, physical therapy or chiropractic care) and medications. I will schedule a bilateral L5-S1 TFESI.  Risk, benefits, pros and cons of procedure were explained to the patient using models and diagrams and their questions were answered.  The patient has severe anxiety of procedures that necessitates monitored anesthesia care (MAC). The procedure performed will be close to major nerves, arteries, and spinal cord and/or joint structures. Due to the proximity of these structures, we need the patient to be still during the procedure. With the help of MAC, this will be safely achieved and decrease the risk of any complications.   MARCELINO Rangel MD

## 2024-08-01 NOTE — PROCEDURE
[Trigger point 1-2 muscle groups] : trigger point 1-2 muscle groups [Bilateral] : bilaterally of the [Lumbar paraspinal muscle] : lumbar paraspinal muscle [Pain] : pain [Repeat series performed] : repeat series performed [Alcohol] : alcohol [Ethyl Chloride sprayed topically] : ethyl chloride sprayed topically [Sterile technique used] : sterile technique used [___ cc    2%] : Lidocaine ~Vcc of 2%  [___ cc    0.5%] : Bupivacaine (Marcaine) ~Vcc of 0.5%  [Call if redness, pain or fever occur] : call if redness, pain or fever occur [Apply ice for 15min out of every hour for the next 12-24 hours as tolerated] : apply ice for 15 minutes out of every hour for the next 12-24 hours as tolerated [Risks, benefits, alternatives discussed / Verbal consent obtained] : the risks benefits, and alternatives have been discussed, and verbal consent was obtained

## 2024-08-01 NOTE — PHYSICAL EXAM
[Normal Coordination] : normal coordination [Normal DTR UE/LE] : normal DTR UE/LE  [Normal Sensation] : normal sensation [Normal Mood and Affect] : normal mood and affect [Oriented] : oriented [Able to Communicate] : able to communicate [Well Developed] : well developed [Well Nourished] : well nourished [Flexion] : flexion [Extension] : extension [] : no swelling [FreeTextEntry9] : Flexion>extension [TWNoteComboBox7] : forward flexion 45 degrees [de-identified] : extension 10 degrees

## 2024-10-01 ENCOUNTER — APPOINTMENT (OUTPATIENT)
Dept: PAIN MANAGEMENT | Facility: CLINIC | Age: 39
End: 2024-10-01
Payer: COMMERCIAL

## 2024-10-01 DIAGNOSIS — M54.17 RADICULOPATHY, LUMBOSACRAL REGION: ICD-10-CM

## 2024-10-01 PROCEDURE — 64483 NJX AA&/STRD TFRM EPI L/S 1: CPT | Mod: 50

## 2024-10-01 PROCEDURE — 93770 DETERMINATION VENOUS PRESS: CPT

## 2024-10-01 PROCEDURE — 94761 N-INVAS EAR/PLS OXIMETRY MLT: CPT

## 2024-10-01 PROCEDURE — 93040 RHYTHM ECG WITH REPORT: CPT | Mod: 79

## 2024-10-01 NOTE — PROCEDURE
[FreeTextEntry3] : SELECTIVE TRANSFORAMINAL LUMBAR EPIDURAL NERVE ROOT INJECTION UNDER FLUOROSCOPY     Date:  10/01/2024  Patient: Miriam Lindo  :  1985   Preoperative Diagnosis: L5 Radiculitis  Procedure: Selective Bilateral L5-S1 Transforaminal Lumbar Epidural Nerve Root Injection under Fluoroscopy  Physician: Mary Flores MD, FAAPMR  Anesthesia: See nurses notes/Local/Cold spray   Medical Necessity:  Failure of conservative management.     Indication for Fluoroscopy:  This procedure requires the precise placement of the spinal needle into the specific paravertebral foramen.  It is the only way to accurately and safely perform the injection.     CONSENT: The possible complications including infection, bleeding, nerve damage, Hospital admission, death or failure of the procedure; though unusual, are theoretically possible. The patient was educated about the of the procedure and alternative therapies. All questions were answered and the patient freely gave consent to proceed.     Monitoring:  Patient had continuous blood pressure, EKG, and pulse oximetry throughout the case. See nurse's notes.     PROCEDURE NOTE:  After obtaining written consent, the patient was then positioned on the fluoroscopy table in the prone position with a pillow beneath the pelvis to reduce lumbar lordosis. The lumbar area was prepped with betadine solution and draped in the usual manner. The fluoroscope was used to identify the L3///L4///L5 vertebral body on the AP projection. It was then rotated into an oblique projection until the superior articulating process of the S1 (inferior) vertebra is projected beneath the 6 o'clock position of the L5 (superior) vertebrae. The 22 gauge 3.5inch needle was inserted in the skin at a point overlying the superior articulating process of the inferior vertebra and aimed for the 6 o'clock position of the superior vertebrae's pedicle.  After the needle contacted bone, a lateral projection was obtained to insure that the needle tip was in proximity with the vertebral body. Paresthesias were not noted.  One ml of Omnipaque 240 was injected and a neurogram was obtained. Following demonstration of the neurogram, 1 ml of Preservative free normal saline and 1 ml of depomedrol (40mg) was injected. The small volume and relatively high concentration was chosen to preserve selectivity and diagnostic value of the injection. There was no CSF nor heme identified. The contralateral side was injected in identical fashion. There were no signs of, intravascular block or hypotension. The needle was removed intact. A band aid was place on the site.     Epidurogram: The nerve root was observed in its outline on the neurogram. Distal and proximal spread was noted pointing away from epidural fibrosis/scarring.   Complications: None. The patient tolerated the procedure well.    Disposition: I have examined the patient and there are no new physical findings since the original presentation.  Sensory and motor function were intact. The patient met discharge criteria see nurses notes. The discharge instruction sheet was reviewed and given to the patient. The patient was discharged home with a .  If patient gets sustained relief will have patient do modified planks 3 times a day on carpet or yoga mat starting at 5 seconds building up to 1 minute without grimacing/Valsalva and walking.      Comment: 1st SNRI today, depending on effectiveness would schedule 32nd SNRI in 1-2 weeks vs caudal epidural steroid vs follow up in office depending on insurances. Follow up office. Call if any problems    This document was electronically signed by:    Mary Flores MD, FAAPMR Diplomate, American Board of Physical Medicine and Rehabilitation Diplomate, American Board of Pain Medicine

## 2024-10-14 NOTE — ASU PATIENT PROFILE, ADULT - AS SC BRADEN SENSORY
(4) no impairment
[facility]  [address] [place] [id number]  Phone: [phone]  Fax: [phone]  Follow Up Time: 1-3 Days

## 2024-10-16 ENCOUNTER — APPOINTMENT (OUTPATIENT)
Dept: PAIN MANAGEMENT | Facility: CLINIC | Age: 39
End: 2024-10-16
Payer: COMMERCIAL

## 2024-10-16 VITALS — WEIGHT: 120 LBS | BODY MASS INDEX: 18.83 KG/M2 | HEIGHT: 67 IN

## 2024-10-16 DIAGNOSIS — M79.18 MYALGIA, OTHER SITE: ICD-10-CM

## 2024-10-16 DIAGNOSIS — M62.830 MUSCLE SPASM OF BACK: ICD-10-CM

## 2024-10-16 DIAGNOSIS — M54.50 LOW BACK PAIN, UNSPECIFIED: ICD-10-CM

## 2024-10-16 DIAGNOSIS — M51.369: ICD-10-CM

## 2024-10-16 DIAGNOSIS — M54.17 RADICULOPATHY, LUMBOSACRAL REGION: ICD-10-CM

## 2024-10-16 PROCEDURE — 99214 OFFICE O/P EST MOD 30 MIN: CPT

## 2024-10-16 RX ORDER — LIDOCAINE 5% 700 MG/1
5 PATCH TOPICAL
Qty: 30 | Refills: 1 | Status: ACTIVE | COMMUNITY
Start: 2024-10-16 | End: 1900-01-01

## 2024-11-12 ENCOUNTER — APPOINTMENT (OUTPATIENT)
Dept: GASTROENTEROLOGY | Facility: CLINIC | Age: 39
End: 2024-11-12

## 2024-12-11 ENCOUNTER — APPOINTMENT (OUTPATIENT)
Dept: PAIN MANAGEMENT | Facility: CLINIC | Age: 39
End: 2024-12-11
Payer: COMMERCIAL

## 2024-12-11 DIAGNOSIS — M54.50 LOW BACK PAIN, UNSPECIFIED: ICD-10-CM

## 2024-12-11 PROCEDURE — 99214 OFFICE O/P EST MOD 30 MIN: CPT

## 2024-12-13 ENCOUNTER — APPOINTMENT (OUTPATIENT)
Dept: PAIN MANAGEMENT | Facility: CLINIC | Age: 39
End: 2024-12-13
Payer: COMMERCIAL

## 2024-12-13 DIAGNOSIS — M54.17 RADICULOPATHY, LUMBOSACRAL REGION: ICD-10-CM

## 2024-12-13 PROCEDURE — 64483 NJX AA&/STRD TFRM EPI L/S 1: CPT | Mod: 50

## 2024-12-13 PROCEDURE — 93770 DETERMINATION VENOUS PRESS: CPT

## 2024-12-13 PROCEDURE — 94761 N-INVAS EAR/PLS OXIMETRY MLT: CPT

## 2024-12-13 PROCEDURE — 93040 RHYTHM ECG WITH REPORT: CPT | Mod: 79

## 2025-01-08 ENCOUNTER — APPOINTMENT (OUTPATIENT)
Dept: PAIN MANAGEMENT | Facility: CLINIC | Age: 40
End: 2025-01-08
Payer: COMMERCIAL

## 2025-01-08 DIAGNOSIS — M54.50 LOW BACK PAIN, UNSPECIFIED: ICD-10-CM

## 2025-01-08 DIAGNOSIS — M54.17 RADICULOPATHY, LUMBOSACRAL REGION: ICD-10-CM

## 2025-01-08 PROCEDURE — 99213 OFFICE O/P EST LOW 20 MIN: CPT

## 2025-02-19 ENCOUNTER — APPOINTMENT (OUTPATIENT)
Dept: PAIN MANAGEMENT | Facility: CLINIC | Age: 40
End: 2025-02-19

## 2025-03-12 ENCOUNTER — APPOINTMENT (OUTPATIENT)
Dept: PAIN MANAGEMENT | Facility: CLINIC | Age: 40
End: 2025-03-12
Payer: COMMERCIAL

## 2025-03-12 DIAGNOSIS — M54.50 LOW BACK PAIN, UNSPECIFIED: ICD-10-CM

## 2025-03-12 PROCEDURE — 99214 OFFICE O/P EST MOD 30 MIN: CPT

## 2025-03-21 ENCOUNTER — APPOINTMENT (OUTPATIENT)
Dept: PAIN MANAGEMENT | Facility: CLINIC | Age: 40
End: 2025-03-21
Payer: COMMERCIAL

## 2025-03-21 DIAGNOSIS — M54.17 RADICULOPATHY, LUMBOSACRAL REGION: ICD-10-CM

## 2025-03-21 PROCEDURE — 93770 DETERMINATION VENOUS PRESS: CPT

## 2025-03-21 PROCEDURE — 94761 N-INVAS EAR/PLS OXIMETRY MLT: CPT

## 2025-03-21 PROCEDURE — 64483 NJX AA&/STRD TFRM EPI L/S 1: CPT | Mod: 50

## 2025-03-21 PROCEDURE — 93040 RHYTHM ECG WITH REPORT: CPT | Mod: 79

## 2025-04-02 ENCOUNTER — APPOINTMENT (OUTPATIENT)
Dept: PAIN MANAGEMENT | Facility: CLINIC | Age: 40
End: 2025-04-02
Payer: COMMERCIAL

## 2025-04-02 DIAGNOSIS — M54.17 RADICULOPATHY, LUMBOSACRAL REGION: ICD-10-CM

## 2025-04-02 PROCEDURE — 99214 OFFICE O/P EST MOD 30 MIN: CPT

## 2025-04-02 RX ORDER — DICLOFENAC SODIUM AND MISOPROSTOL 75; 200 MG/1; UG/1
75-0.2 TABLET, DELAYED RELEASE ORAL TWICE DAILY
Qty: 60 | Refills: 1 | Status: ACTIVE | COMMUNITY
Start: 2025-04-02 | End: 1900-01-01